# Patient Record
Sex: FEMALE | Race: WHITE
[De-identification: names, ages, dates, MRNs, and addresses within clinical notes are randomized per-mention and may not be internally consistent; named-entity substitution may affect disease eponyms.]

---

## 2020-10-07 ENCOUNTER — HOSPITAL ENCOUNTER (OUTPATIENT)
Dept: HOSPITAL 88 - CT | Age: 70
End: 2020-10-07
Attending: FAMILY MEDICINE
Payer: MEDICARE

## 2020-10-07 DIAGNOSIS — R10.0: Primary | ICD-10-CM

## 2020-10-07 DIAGNOSIS — R63.4: ICD-10-CM

## 2020-10-07 LAB
BUN SERPL-MCNC: 9 MG/DL (ref 7–26)
BUN/CREAT SERPL: 11 (ref 6–25)
EGFRCR SERPLBLD CKD-EPI 2021: > 60 ML/MIN (ref 60–?)

## 2020-10-07 PROCEDURE — 82565 ASSAY OF CREATININE: CPT

## 2020-10-07 PROCEDURE — 74177 CT ABD & PELVIS W/CONTRAST: CPT

## 2020-10-07 PROCEDURE — 84520 ASSAY OF UREA NITROGEN: CPT

## 2020-10-07 PROCEDURE — 36415 COLL VENOUS BLD VENIPUNCTURE: CPT

## 2020-10-07 NOTE — DIAGNOSTIC IMAGING REPORT
CT of the abdomen and pelvis with contrast





TECHNIQUE: CT of the abdomen and pelvis WITH intravenous contrast and WITHOUT

oral contrast. Dose modulation, iterative reconstruction, and/or weight-based

adjustment of the mA/kV was utilized to reduce the radiation dose to as low as

reasonably achievable.



IV CONTRAST: 100 mL of Isovue-370

            ORAL CONTRAST: None

            RADIATION DOSE: Total DLP: 257 mGy*cm

            COMPLICATIONS: None



INDICATION: 

^[id number]

^[id number]

^ACUTE ABD PAIN; Abnormal weight los.



COMPARISON: None.





FINDINGS:



LOWER THORAX: Unremarkable.



HEPATOBILIARY: No focal hepatic lesions. Gallbladder is unremarkable. No

biliary ductal dilatation.

SPLEEN: No splenomegaly.

PANCREAS: Severe fatty atrophy.



ADRENALS: No adrenal nodules.

KIDNEYS/URETERS: No hydronephrosis, stones, or masses.

PELVIC ORGANS/BLADDER: Urinary bladder is unremarkable. Uterus damages abnormal

endometrial thickening measuring up to 1.6 cm. Probable posterior fibroid is

noted. No suspicious adnexal mass. No pelvic free fluid.



PERITONEUM/RETROPERITONEUM: No free air or fluid.

LYMPH NODES: No lymphadenopathy.

VESSELS: Negative for abdominal aortic aneurysm.



GI TRACT: Stomach is moderately distended with free fluid. Multiple

hyperenhancing fluid-filled small bowel loops are noted in the left mid

abdomen. Negative for bowel obstruction. Numerous diverticula are noted

throughout the colon without surrounding inflammatory changes. Normal appendix

is noted.



BONES AND SOFT TISSUES: No acute osseous abnormality. Mild multilevel

degenerative changes of the spine are noted, most prominent at L5-S1. No

suspicious lytic or blastic lesion is noted. Soft tissues are unremarkable.





IMPRESSION:



1. Multiple hyperenhancing fluid-filled small bowel loops with mild wall

thickening are concerning for enteritis.

2. Thickening of the endometrial stripe, abnormal patient's age. Consider

gynecological evaluation with possible endometrial biopsy.

3. Diffuse colonic diverticulosis. Correlate with most recent colonoscopy. If

none has been performed within the last 10 years, recommend GI consultation.

4. Severe fatty atrophy of the pancreas.



Signed by: Bill Cummins MD on 10/7/2020 4:50 PM